# Patient Record
(demographics unavailable — no encounter records)

---

## 2024-12-17 NOTE — HISTORY OF PRESENT ILLNESS
[FreeTextEntry1] : Patient was seen by  in May 2024 for bilateral groin pain.  Patient was in [facility] with lots of stress, developed discomfort to bilateral groin, was given Levofloxacin and Silodosin, which were not helping much. Saw  in May, PVR 78ml. Patient is here for follow-up, denied any further discomfort to groin area, concerning BPH and incomplete bladder emptying, needs double voiding. PVR 49ml today   Please refer to URO Consult Note.

## 2024-12-17 NOTE — LETTER BODY
[FreeTextEntry1] : Dangelo Finn MD [address], [place] [id number] [phone]  Dear Dr. Finn,        Reason for Visit: BPH.       This is a 54 year-old gentleman with symptoms of BPH. Patient is here today for evaluation. Patient was seen by  in May 2024 for bilateral groin pain after he was in [place]. Patient reports he has weak uroflow, frequency, and hesitancy. He denies any hematuria or urinary incontinence. His symptoms are aggravated by hydration. He denies any alleviating factors. He has not tried any medical therapy previously. He reports no pain. All other review of systems are negative. He has no cancer in his family medical history. He has no previous surgical history. Past medical history, family history and social history were inquired and were noncontributory to current condition. The patient does not use tobacco or drink alcohol. Medications and allergies were reviewed. He has no known allergies to medication.       On examination, the patient is a healthy-appearing gentleman in no acute distress. He is alert and oriented follows commands. He has normal mood and affect. He is normocephalic. Neck is supple. Oral no thrush Respirations are unlabored. His abdomen is soft and nontender. Bladder is nonpalpable. No CVA tenderness. Neurologically he is grossly intact. No peripheral edema. Skin without gross abnormality. He has normal male external genitalia. Normal meatus. Bilateral testes are descended intrascrotally and normal to palpation. On rectal examination, there is normal sphincter tone. The prostate is clinically benign without focal induration or nodularity.       Post-void residual on bladder scan today was 49 cc.       ASSESSMENT: BPH.       I counseled the patient on the various etiology of his symptoms. I discussed the natural history of BPH and the treatment options available. I discussed the options of conservative management with fluid in dietary restrictions, herbal therapy, medical therapy, and minimally invasive procedures. His PVR today was 49 cc. I recommended he try Flomax. I discussed the potential side effects of the medication. I counseled the patient on its use and side effects. If the patient develops any side effects, the patient will discontinue the medication and contact me. He will obtain PSA, BMP, and urinalysis to establish baseline. Risks and alternatives were discussed. I answered the patient questions. The patient will follow-up as directed and will contact me with any questions or concerns. Thank you for the opportunity to participate in the care of Mr. BERGMAN. I will keep you updated on his progress.       Plan: Trial of Flomax. Follow up in 1 month.

## 2024-12-17 NOTE — HISTORY OF PRESENT ILLNESS
[FreeTextEntry1] : Patient was seen by  in May 2024 for bilateral groin pain.  Patient was in [facility] with lots of stress, developed discomfort to bilateral groin, was given Levofloxacin and Silodosin, which were not helping much. Saw  in May, PVR 78ml. Patient is here for follow-up, denied any further discomfort to groin area, concerning BPH and incomplete bladder emptying, needs double voiding. PVR 49ml today   Please refer to URO Consult Note.

## 2024-12-17 NOTE — ADDENDUM
[FreeTextEntry1] : Entered by Greg Dunham, acting as scribe for Dr. Jessee Alexander. The documentation recorded by the scribe accurately reflects the service I personally performed and the decisions made by me.

## 2024-12-17 NOTE — LETTER BODY
[FreeTextEntry1] : Dangelo Finn MD [address], [place] [id number] [phone]  Dear Dr. Finn,        Reason for Visit: BPH.       This is a 54 year-old gentleman with symptoms of BPH. Patient is here today for evaluation. Patient was seen by  in May 2024 for bilateral groin pain after he was in [place]. Patient reports he has weak uroflow, frequency, and hesitancy. He denies any hematuria or urinary incontinence. His symptoms are aggravated by hydration. He denies any alleviating factors. He has not tried any medical therapy previously. He reports no pain. All other review of systems are negative. He has no cancer in his family medical history. He has no previous surgical history. Past medical history, family history and social history were inquired and were noncontributory to current condition. The patient does not use tobacco or drink alcohol. Medications and allergies were reviewed. He has no known allergies to medication.       On examination, the patient is a healthy-appearing gentleman in no acute distress. He is alert and oriented follows commands. He has normal mood and affect. He is normocephalic. Neck is supple. Oral no thrush Respirations are unlabored. His abdomen is soft and nontender. Bladder is nonpalpable. No CVA tenderness. Neurologically he is grossly intact. No peripheral edema. Skin without gross abnormality. He has normal male external genitalia. Normal meatus. Bilateral testes are descended intrascrotally and normal to palpation. On rectal examination, there is normal sphincter tone. The prostate is clinically benign without focal induration or nodularity.       Post-void residual on bladder scan today was 49 cc.       ASSESSMENT: BPH.       I counseled the patient on the various etiology of his symptoms. I discussed the natural history of BPH and the treatment options available. I discussed the options of conservative management with fluid in dietary restrictions, herbal therapy, medical therapy, and minimally invasive procedures. His PVR today was 49 cc. I recommended he try Flomax. I discussed the potential side effects of the medication. I counseled the patient on its use and side effects. If the patient develops any side effects, the patient will discontinue the medication and contact me. He will obtain PSA, BMP, and urinalysis to establish baseline. Risks and alternatives were discussed. I answered the patient questions. The patient will follow-up as directed and will contact me with any questions or concerns. Thank you for the opportunity to participate in the care of Mr. BERGMAN. I will keep you updated on his progress.       Plan: Trial of Flomax. Follow up in 1 month.